# Patient Record
Sex: FEMALE | Race: WHITE
[De-identification: names, ages, dates, MRNs, and addresses within clinical notes are randomized per-mention and may not be internally consistent; named-entity substitution may affect disease eponyms.]

---
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
## 2018-11-09 ENCOUNTER — HOSPITAL ENCOUNTER (EMERGENCY)
Dept: HOSPITAL 25 - ED | Age: 18
Discharge: HOME | End: 2018-11-09
Payer: COMMERCIAL

## 2018-11-09 VITALS — SYSTOLIC BLOOD PRESSURE: 123 MMHG | DIASTOLIC BLOOD PRESSURE: 78 MMHG
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
## 2018-11-09 DIAGNOSIS — S53.105A: Primary | ICD-10-CM
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
## 2018-11-09 DIAGNOSIS — R60.0: ICD-10-CM

## 2018-11-09 PROCEDURE — 99282 EMERGENCY DEPT VISIT SF MDM: CPT

## 2018-11-09 NOTE — ED
Upper Extremity Pain





- HPI Summary


HPI Summary: 


Alethea presents to the ER with left elbow pain following a dislocation and 

spontaneous reduction. Patient states while diving at a swim meet she hit the 

bottom of the pool with her arm and it dislocated, when she resurfaced she 

states the elbow reduced on its own. While being pulled out of the pool the 

elbow dislocated again and while being examined by the  her 

elbow was reduced again spontaneously. She is currently complaining of 

significant pain of her left elbow that began with the onset of the injury. She 

has a history of a ulnar collateral ligament tear of her left arm. Patient 

states the pain is constant and sharp. It is worse with any movement. Patient 

has not tried anything to improve the pain. Patient denies any numbness, 

tingling, or coolness to the extremity. Patient states movement of her left arm 

is difficult due to pain. Patient denies any radiation. 








- History of Current Complaint


Chief Complaint: EDExtremityUpper


Stated Complaint: LT ELBOW INJURY


Time Seen by Provider: 11/09/18 20:13


Hx Obtained From: Patient - Trauma from diving into pool.


Hx Last Menstrual Period: NEXPLANON


Onset/Duration: Started Hours Ago


Timing: Constant


Severity Initially: Moderate


Severity Currently: Moderate


Pain Location: Elbow


Character: Sharp, Throbbing


Aggravating Factor(s): Movement, Flexion


Alleviating Factor(s): Rest


Associated Signs & Symptoms: Positive: Swelling


Related History: Similar Episode/Dx As - Patient has history of tear to ulnar 

collateral ligament of the left arm.





- Risk Factors


DVT Risk Factors: Negative


Septic Arthritis Risk Factor: Negative





- Allergies/Home Medications


Allergies/Adverse Reactions: 


 Allergies











Allergy/AdvReac Type Severity Reaction Status Date / Time


 


No Known Allergies Allergy   Verified 10/26/17 20:31














PMH/Surg Hx/FS Hx/Imm Hx


Endocrine/Hematology History: 


   Denies: Hx Anticoagulant Therapy, Hx Blood Disorders, Hx Diabetes


Cardiovascular History: 


   Denies: Hx Hypertension, Hx Pacemaker/ICD


 History: 


   Denies: Hx Renal Disease


Sensory History: 


   Denies: Hx Hearing Aid


Psychiatric History: 


   Denies: Hx Panic Disorder





- Surgical History


Surgery Procedure, Year, and Place: WISDOM TEETH


Infectious Disease History: No


Infectious Disease History: 


   Denies: Traveled Outside the [place] in Last 30 Days





- Family History


Known Family History: Positive: None





- Social History


Alcohol Use: None


Hx Substance Use: No


Substance Use Type: Reports: None


Hx Tobacco Use: No


Smoking Status (MU): Never Smoked Tobacco





Review of Systems


Constitutional: Negative


Negative: Chest Pain


Negative: Shortness Of Breath


Positive: Decreased ROM, Edema


Negative: Weakness, Numbness


All Other Systems Reviewed And Are Negative: Yes





Physical Exam


Vital Signs On Initial Exam: 


 Initial Vitals











Temp Pulse Resp BP Pulse Ox


 


 98.4 F   84   20   125/76   100 


 


 11/09/18 19:56  11/09/18 19:56  11/09/18 19:56  11/09/18 19:56  11/09/18 19:56











Appearance: Positive: Well-Appearing, Pain Distress


Head/Face: Positive: Normal Head/Face Inspection


Eyes: Positive: Normal


Neck: Positive: Supple, Nontender, No Lymphadenopathy


Respiratory/Lung Sounds: Positive: Clear to Auscultation, Breath Sounds Present


Cardiovascular: Positive: Normal, RRR, Pulses are Symmetrical in both Upper and 

Lower Extremities


Abdomen Description: Positive: Nontender, No Organomegaly, Soft


Bowel Sounds: Positive: Present


Musculoskeletal: Positive: Other - Inspection of the left elbow shows edema. 

Patient is able to pronate and supinate. Patient is able to extend elbow but 

flexion is limited secondary to pain. Sensation to light touch is intact 

bilaterally. Radial pulses are 2+ bilaterally. Capillary refill is <2 seconds 

bilaterally. Palpation of the medial aspect of the elbow elicits significant 

pain. Generalized tenderness noted on palpation of the elbow.


Neurological: Positive: Normal, Sensory/Motor Intact, Alert, Oriented to Person 

Place, Time





Diagnostics





- Vital Signs


 Vital Signs











  Temp Pulse Resp BP Pulse Ox


 


 11/09/18 21:29   82  16  123/78  99


 


 11/09/18 19:56  98.4 F  84  20  125/76  100














- Laboratory


Lab Statement: Any lab studies that have been ordered have been reviewed, and 

results considered in the medical decision making process.





- Radiology


  ** No standard instances


Radiology Interpretation Completed By: ED Physician - X-ray of the left elbow 

shows reduction. Avulsion fracture, possibly from previous injury, noted on the 

medial epicondyle of the left humerus. X-ray interpreted by ED PA





Course/Dx





- Course


Course Of Treatment: Patient was seen in ED following injury to elbow during 

swim meet. X-ray was obtained and interpreted and showed reduction of 

dislocation and a possibly old avulsion fracture to medial epicondyle. Patient 

was given (2) Norco 5mg/325mg for pain control. Given sling for elbow 

immobilization.  Patient given prescription for 3 days for pain management as 

needed. Patient instructed to take ibuprofen 600mg 3x daily. Patient told to 

follow up with ortho on Monday.  Patient instructed to return to ED if pain not 

controlled with medication, if hand loses sensation, becomes cold, or shows 

signs of no blood flow. Patient and mother displayed understanding of and 

agreed to discharge plan.  No ecchymosis is noted.  Consideration for possible 

UCL tear or other ligamentous injury. Discussed case with Dr. Mejia who agrees 

with treatment plan.  Patient was seen by ED PA student, ED PA-C as well.





- Diagnoses


Differential Diagnosis/HQI/PQRI: Positive: Fracture (Open), Fracture (Closed), 

Strain, Sprain


Provider Diagnoses: 


 Elbow dislocation








Discharge





- Sign-Out/Discharge


Documenting (check all that apply): Patient Departure





- Discharge Plan


Condition: Stable


Disposition: HOME


Prescriptions: 


Hydrocodone/Acetamin 10/325(NF [Norco 10/325 (NF)] 1 tab PO Q6H #12 tab MDD 4


Patient Education Materials:  Elbow Dislocation (ED), Elbow Fracture (ED)


Referrals: 


Mani Truong MD [Medical Doctor] - 


Eliazar Caldwell DO [Primary Care Provider] - 


Additional Instructions: 


Please follow up with ortho - call Monday morning for an appt


Keep arm in sling


If you develop any worsening pain, numbness or tingling - return to the ED 

immediately





- Billing Disposition and Condition


Condition: STABLE


Disposition: Home

## 2018-11-19 ENCOUNTER — HOSPITAL ENCOUNTER (OUTPATIENT)
Dept: HOSPITAL 25 - OREAST | Age: 18
Discharge: HOME | End: 2018-11-19
Attending: ORTHOPAEDIC SURGERY
Payer: COMMERCIAL

## 2018-11-19 VITALS — DIASTOLIC BLOOD PRESSURE: 64 MMHG | SYSTOLIC BLOOD PRESSURE: 115 MMHG
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
## 2018-11-19 DIAGNOSIS — G89.18: ICD-10-CM
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
## 2018-11-19 DIAGNOSIS — S42.442A: Primary | ICD-10-CM

## 2018-11-19 DIAGNOSIS — S53.442A: ICD-10-CM

## 2018-11-19 PROCEDURE — 76000 FLUOROSCOPY <1 HR PHYS/QHP: CPT

## 2018-11-19 PROCEDURE — C1713 ANCHOR/SCREW BN/BN,TIS/BN: HCPCS

## 2018-11-19 PROCEDURE — 81025 URINE PREGNANCY TEST: CPT

## 2018-12-10 NOTE — OP
DATE OF OPERATION:  11/19/18 [facility]

 

DATE OF BIRTH:  [birth year]

 

SURGEON:  Clau Santos MD

 

ASSISTANT:  BREN Lizarraga

 

ANESTHESIOLOGIST:  Dr. Odonnell.

 

ANESTHESIA:  General with nerve block.

 

PRE-OP DIAGNOSES:  Left elbow dislocation with acute on chronic medial 
epicondyle fracture and tear of the ulnar collateral ligament.

 

POST-OP DIAGNOSES:  Left elbow dislocation with acute on chronic medial 
epicondyle fracture and tear of the ulnar collateral ligament.

 

OPERATIVE PROCEDURE:

1.  UCL repair.

2.  Repair of the common flexor wad.

3.  Ulnar nerve in situ decompression and neurolysis.

 

INDICATIONS:  Alethea Nelson is a 18-year-old female who sustained an elbow 
dislocation.  She was reduced and then spontaneously dislocated again.  She has 
MRI findings as well as CT findings of a chronic medial epicondyle fracture 
nonunion as well as the tear of the common flexor as well as the UCL.  She is a 
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  The risks and benefits of surgery 
were discussed at length to include but not limited to bleeding, infection, 
damage to nerves, vessels, surrounding structures, wound nonhealing, persistent 
pain, need for surgery, scarring, stiffness, incomplete relief of symptoms and 
risk of anesthesia.

 

COMPLICATIONS:  None.

 

ESTIMATED BLOOD LOSS:  Minimal.

 

TOURNIQUET TIME:  90 minutes.

 

IMPLANTS USED:  One SwiveLock by [employer].

 

DESCRIPTION OF PROCEDURE:  The patient was greeted in the preoperative area by 
the attending surgeon.  The correct extremity was marked and consent was 
confirmed. The patient underwent anesthesia and nerve block after which she was 
brought back to the operative suite, she was placed in supine position on 
operating room table. Her left arm was extended on the hand table.  Unsterile 
tourniquet was placed high on the proximal arm.  The left upper extremity was 
then prepped and draped in the usual sterile fashion beginning with 
chlorhexidine soap, scrub, and alcohol wipe, and a final prep with ChloraPrep.

 

After appropriate surgical pause indicating side, site, procedure, and 
administration of antibiotics, the limb was exsanguinated and the tourniquet 
inflated. A medial incision was then centered where the medial epicondyle would 
have been, soft tissue carefully dissected to expose the fascia, care was taken 
to cheat anteriorly so as to not damage the nerve.  Soft tissues were carefully 
exposed.  The common flexor mass was visualized, it was then avulsed by about 
to 2 cm.  There is a bony piece that was visualized, but there was no fracture 
hematoma and this is a well circumscribed area.  This was tagged for later 
identification.  Attention was then directed to the nerve.  Proximally, the 
nerve was then identified, then tracked distally. The ulnar nerve tracked 
distally all the while to take care of releasing any adhesions.  It was 
carefully released from the scar tissue around the medial epicondyle. The nerve 
was then protected throughout the entirety of the case and released in situ all 
the way to the FCU fascia.  Once all areas were released, the nerve was 
identified and protected throughout the case.  



Attention was directed back to the elbow.  The medial epicondyle donor site was 
then identified.  Any remaining scar tissue was then carefully removed.  The 
UCL was then identified.  The plan was initially to do a UCL reconstruction, 
but because there was significant amount of scar tissue above the UCL distally, 
the decision was made to, as the tissue quality was quite good to grab this, it 
was to try to grab it and then place it with anchor fixation at the footprint.  
The footprint was then prepared with a rasp as well as an osteotome to allow 
for a bony bleeding bed.  After this is done, the [employer] guide was then used 
to drill.  The tunnel position was quite long and not optimal.  The decision 
was made to not place in anchor in case there will be evidence of impingement.  
Instead using the UCL guide, we did a modified technique where we passed 
running suture through the ulnar collateral ligament proximally and then docked 
it into the previously placed tunnel and tied over the tunnel itself with the 
elbow and approximately 30 degrees of flexion and gentle varus stress.  The 
elbow was then taken through range of motion and gentle stress and the repair 
appeared to be stable.  Attention was then directed to the common flexor wad, 
the nonunion, malunion piece was then identified, it was then scalloped out 
because this could not be repaired successfully.  A SwiveLock was then placed 
and the sutures were then passed through the tendon in a whipstitch fashion to 
whipstitch the tendon and then repaired it back to the medial epicondyle.  The 
elbow was taken through range of motion, gentle varus and valgus stress were 
tested, was found to be stable.  The wounds were then copiously irrigated with 
saline.  The nerve was found to be intact and protected again throughout the 
entirety of the case.  The skin was then closed in layers with 2-0 Vicryl and 3-
0 Monocryl.  Sterile dressings were applied and a posterior splint was then 
applied including the risks to prevent any flexion extension of the wrist.  
Sterile dressings were applied.  Tourniquet was deflated for a total time about 
an hour and a half.  The patient was awoken from anesthesia, transferred back 
to PACU in stable condition.

 

POSTOPERATIVE PLAN:  She will be nonweightbearing.  She will be in this splint 
for about 10 days and then transitioned to an hinged elbow brace and work on 
range of motion.  She will be discharged on pain medication.  I will see the 
patient back in 10 to 14 days.

 

 [id number]/CPS #: [id number]

BRYAN